# Patient Record
Sex: FEMALE | Race: BLACK OR AFRICAN AMERICAN | NOT HISPANIC OR LATINO
[De-identification: names, ages, dates, MRNs, and addresses within clinical notes are randomized per-mention and may not be internally consistent; named-entity substitution may affect disease eponyms.]

---

## 2019-03-19 PROBLEM — Z00.00 ENCOUNTER FOR PREVENTIVE HEALTH EXAMINATION: Status: ACTIVE | Noted: 2019-03-19

## 2019-03-20 ENCOUNTER — RECORD ABSTRACTING (OUTPATIENT)
Age: 84
End: 2019-03-20

## 2019-03-20 ENCOUNTER — APPOINTMENT (OUTPATIENT)
Dept: ORTHOPEDIC SURGERY | Facility: CLINIC | Age: 84
End: 2019-03-20
Payer: MEDICARE

## 2019-03-20 VITALS — BODY MASS INDEX: 32.02 KG/M2 | HEIGHT: 62 IN | WEIGHT: 174 LBS

## 2019-03-20 DIAGNOSIS — Z86.79 PERSONAL HISTORY OF OTHER DISEASES OF THE CIRCULATORY SYSTEM: ICD-10-CM

## 2019-03-20 DIAGNOSIS — M22.40 CHONDROMALACIA PATELLAE, UNSPECIFIED KNEE: ICD-10-CM

## 2019-03-20 DIAGNOSIS — M47.817 SPONDYLOSIS W/OUT MYELOPATHY OR RADICULOPATHY, LUMBOSACRAL REGION: ICD-10-CM

## 2019-03-20 DIAGNOSIS — M25.40 EFFUSION, UNSPECIFIED JOINT: ICD-10-CM

## 2019-03-20 DIAGNOSIS — M65.9 SYNOVITIS AND TENOSYNOVITIS, UNSPECIFIED: ICD-10-CM

## 2019-03-20 DIAGNOSIS — S83.281S OTHER TEAR OF LATERAL MENISCUS, CURRENT INJURY, RIGHT KNEE, SEQUELA: ICD-10-CM

## 2019-03-20 DIAGNOSIS — S83.249A OTHER TEAR OF MEDIAL MENISCUS, CURRENT INJURY, UNSPECIFIED KNEE, INITIAL ENCOUNTER: ICD-10-CM

## 2019-03-20 DIAGNOSIS — M47.812 SPONDYLOSIS W/OUT MYELOPATHY OR RADICULOPATHY, CERVICAL REGION: ICD-10-CM

## 2019-03-20 DIAGNOSIS — M65.30 TRIGGER FINGER, UNSPECIFIED FINGER: ICD-10-CM

## 2019-03-20 DIAGNOSIS — I82.409 ACUTE EMBOLISM AND THROMBOSIS OF UNSPECIFIED DEEP VEINS OF UNSPECIFIED LOWER EXTREMITY: ICD-10-CM

## 2019-03-20 DIAGNOSIS — Z78.9 OTHER SPECIFIED HEALTH STATUS: ICD-10-CM

## 2019-03-20 DIAGNOSIS — Z86.39 PERSONAL HISTORY OF OTHER ENDOCRINE, NUTRITIONAL AND METABOLIC DISEASE: ICD-10-CM

## 2019-03-20 DIAGNOSIS — Z82.49 FAMILY HISTORY OF ISCHEMIC HEART DISEASE AND OTHER DISEASES OF THE CIRCULATORY SYSTEM: ICD-10-CM

## 2019-03-20 DIAGNOSIS — M79.7 FIBROMYALGIA: ICD-10-CM

## 2019-03-20 DIAGNOSIS — M17.10 UNILATERAL PRIMARY OSTEOARTHRITIS, UNSPECIFIED KNEE: ICD-10-CM

## 2019-03-20 DIAGNOSIS — S63.509A UNSPECIFIED SPRAIN OF UNSPECIFIED WRIST, INITIAL ENCOUNTER: ICD-10-CM

## 2019-03-20 DIAGNOSIS — M19.91 PRIMARY OSTEOARTHRITIS, UNSPECIFIED SITE: ICD-10-CM

## 2019-03-20 PROCEDURE — 20610 DRAIN/INJ JOINT/BURSA W/O US: CPT | Mod: 50

## 2019-03-20 PROCEDURE — 99213 OFFICE O/P EST LOW 20 MIN: CPT | Mod: 25

## 2019-03-20 RX ORDER — ALENDRONATE SODIUM 70 MG/1
70 TABLET ORAL
Refills: 0 | Status: ACTIVE | COMMUNITY

## 2019-03-20 RX ORDER — BRIMONIDINE TARTRATE, TIMOLOL MALEATE 2; 5 MG/ML; MG/ML
0.2-0.5 SOLUTION/ DROPS OPHTHALMIC
Refills: 0 | Status: ACTIVE | COMMUNITY

## 2019-03-20 RX ORDER — DICLOFENAC SODIUM 20 MG/G
SOLUTION TOPICAL
Refills: 0 | Status: ACTIVE | COMMUNITY

## 2019-03-20 RX ORDER — NAPROXEN 250 MG/1
250 TABLET ORAL
Refills: 0 | Status: ACTIVE | COMMUNITY

## 2019-03-20 RX ORDER — OXYBUTYNIN CHLORIDE 10 MG/1
10 TABLET, EXTENDED RELEASE ORAL
Refills: 0 | Status: ACTIVE | COMMUNITY

## 2019-03-20 RX ORDER — TRIAMCINOLONE ACETONIDE 10 MG/ML
10 INJECTION, SUSPENSION INTRA-ARTICULAR; INTRALESIONAL
Refills: 0 | Status: ACTIVE | COMMUNITY

## 2019-03-20 RX ORDER — LATANOPROST/PF 0.005 %
0.01 DROPS OPHTHALMIC (EYE)
Refills: 0 | Status: ACTIVE | COMMUNITY

## 2019-03-20 RX ORDER — DOCUSATE SODIUM 100 MG/1
100 CAPSULE ORAL TWICE DAILY
Refills: 0 | Status: ACTIVE | COMMUNITY

## 2019-03-20 RX ORDER — SIMVASTATIN 40 MG/1
40 TABLET, FILM COATED ORAL
Refills: 0 | Status: ACTIVE | COMMUNITY

## 2019-03-20 RX ORDER — AMMONIUM LACTATE 12 %
12 CREAM (GRAM) TOPICAL
Refills: 0 | Status: ACTIVE | COMMUNITY

## 2019-03-20 RX ORDER — TIMOLOL MALEATE 2.5 MG/ML
0.25 SOLUTION OPHTHALMIC
Refills: 0 | Status: ACTIVE | COMMUNITY

## 2019-03-20 RX ORDER — INSULIN GLARGINE 100 [IU]/ML
100 INJECTION, SOLUTION SUBCUTANEOUS
Refills: 0 | Status: ACTIVE | COMMUNITY

## 2019-03-20 RX ORDER — SENNOSIDES 8.6 MG/1
8.6 TABLET ORAL
Refills: 0 | Status: ACTIVE | COMMUNITY

## 2019-03-20 RX ORDER — ATENOLOL AND CHLORTHALIDONE 100; 25 MG/1; MG/1
100-25 TABLET ORAL
Refills: 0 | Status: ACTIVE | COMMUNITY

## 2019-03-20 RX ORDER — TOBRAMYCIN AND DEXAMETHASONE 1; 3 MG/ML; MG/ML
0.3-0.1 SUSPENSION/ DROPS OPHTHALMIC
Refills: 0 | Status: ACTIVE | COMMUNITY

## 2019-03-20 RX ORDER — ERGOCALCIFEROL 1.25 MG/1
1.25 MG CAPSULE ORAL
Refills: 0 | Status: ACTIVE | COMMUNITY

## 2019-03-20 RX ORDER — CALCIUM CARBONATE/VITAMIN D3 500MG-5MCG
500-5 TABLET ORAL
Refills: 0 | Status: ACTIVE | COMMUNITY

## 2019-03-20 RX ORDER — NAPROXEN SODIUM 220 MG
220 TABLET ORAL
Refills: 0 | Status: ACTIVE | COMMUNITY

## 2019-03-20 RX ORDER — ASPIRIN ENTERIC COATED TABLETS 81 MG 81 MG/1
81 TABLET, DELAYED RELEASE ORAL
Refills: 0 | Status: ACTIVE | COMMUNITY

## 2019-03-20 RX ORDER — TRAVOPROST 0.04 MG/ML
0 SOLUTION/ DROPS OPHTHALMIC
Refills: 0 | Status: ACTIVE | COMMUNITY

## 2019-03-20 RX ORDER — ECONAZOLE NITRATE 10 MG/G
1 CREAM TOPICAL
Refills: 0 | Status: ACTIVE | COMMUNITY

## 2019-03-20 RX ORDER — HYALURONATE SODIUM 30 MG/2 ML
30 SYRINGE (ML) INTRAARTICULAR
Refills: 0 | Status: ACTIVE | COMMUNITY

## 2019-03-20 RX ORDER — POTASSIUM CHLORIDE 20 MEQ
20 TABLET, EXT RELEASE, PARTICLES/CRYSTALS ORAL
Refills: 0 | Status: ACTIVE | COMMUNITY

## 2019-03-20 NOTE — HISTORY OF PRESENT ILLNESS
[Worsening] : worsening [___ mths] : [unfilled] month(s) ago [5] : a current pain level of 5/10 [8] : a maximum pain level of 8/10 [Walking] : walking [Intermit.] : ~He/She~ states the symptoms seem to be intermittent [Knee Flexion] : worsened with knee flexion [NSAIDs] : relieved by nonsteroidal anti-inflammatory drugs

## 2019-03-20 NOTE — ASSESSMENT
[FreeTextEntry1] : COLD PACKS OR HEAT AS NEEDED\par \par ALEVE AS NEEDED\par \par KNEE SLEEVE AS PREFERRED

## 2019-03-20 NOTE — REASON FOR VISIT
[Follow-Up Visit] : a follow-up visit for [Knee Osteoarthritis] : knee osteoarthritis [FreeTextEntry2] : BILATERAL - CHRONIC   - ARTHROSCOPY FOR PSEUDOSEPISI AFTER SYNVISC

## 2019-03-20 NOTE — PROCEDURE
[Injection] : Injection [Bilateral] : of bilateral [Knee Joint] : knee joint [Osteoarthritis] : Osteoarthritis [Patient] : patient [Alcohol] : Alcohol [Ethyl Chloride Spray] : ethyl chloride spray was used as a topical anesthetic [Anterior] : anterior [22] : a 22-gauge [1% Lidocaine___(mL)] : [unfilled] mL of 1% Lidocaine [Triamcinolone 10mg/mL___(mL)] : [unfilled] ~UmL of 10mg/mL triamcinolone [Bandage Applied] : a bandage [PRN] : as needed

## 2019-03-20 NOTE — PHYSICAL EXAM
[] : Motor: [NL] : Motor strength of the left lower extremity was normal [LE] : Sensory: Intact in bilateral lower extremities [Knee Swelling Right] : swelling [Knee Tenderness On Palpation Right] : tenderness [Knee Motion Right] : limited ROM [Knee Motion Right Crepitus] : crepitus with ROM [Knee Swelling Left] : swelling [Knee Tenderness On Palpation Left] : tenderness [Knee Motion Left] : limited ROM [Knee Motion Left Crepitus] : crepitus with ROM

## 2019-09-16 ENCOUNTER — APPOINTMENT (OUTPATIENT)
Dept: ORTHOPEDIC SURGERY | Facility: CLINIC | Age: 84
End: 2019-09-16
Payer: MEDICARE

## 2019-09-16 VITALS — BODY MASS INDEX: 32.02 KG/M2 | WEIGHT: 174 LBS | HEIGHT: 62 IN

## 2019-09-16 DIAGNOSIS — M17.0 BILATERAL PRIMARY OSTEOARTHRITIS OF KNEE: ICD-10-CM

## 2019-09-16 PROCEDURE — 20611 DRAIN/INJ JOINT/BURSA W/US: CPT | Mod: 50

## 2019-09-16 PROCEDURE — 99213 OFFICE O/P EST LOW 20 MIN: CPT | Mod: 25

## 2019-09-16 NOTE — HISTORY OF PRESENT ILLNESS
[Worsening] : worsening [___ mths] : [unfilled] month(s) ago [5] : a current pain level of 5/10 [8] : a maximum pain level of 8/10 [Walking] : walking [Intermit.] : ~He/She~ states the symptoms seem to be intermittent [Knee Flexion] : worsened with knee flexion [NSAIDs] : relieved by nonsteroidal anti-inflammatory drugs [de-identified] : BI LATERAL KNEE PAIN\par FOLLOW UP\par FLARE UP 3 WEEKS AGO\par PAIN LEVEL 7-8/10\par STIFFNESS

## 2019-09-16 NOTE — PROCEDURE
[de-identified] : Patient has demonstrated limited relief from NSAIDS, rest, exercises / PT , and after discussion of the risks and benefits, the patient has elected to proceed with a\par n ULTRASOUND GUIDED corticosteroid injection into the BILATERAL SupeLat PF Knee\par  \par Confirmed that the patient does not have history of prior adverse reactions, active, infections, or relevant allergies. There was no effusion, erythema, or warmth, and the skin was clear\par \par The skin was sterilized with alcohol. Ethyl Chloride was used as a topical anesthetic. Routine sterile technique. \par  \par The KNEE JOINT  was injected utilizing ULTRASOUND GUIDANCEwith KENALOG + LIDOCAINE. The injection was completed without complication and a bandage was applied.\par \par The patient tolerated the procedure well and was given post-injection instructions.Rec: Cold therapy, analgesics, avoid heavy activity.\par \par MEDICATION: Lidocaine 1% 4cc + 10mg of Kenalog\par

## 2019-09-16 NOTE — DISCUSSION/SUMMARY
[de-identified] : POST INJECTION INSTRUCTIONS\par \par COLD THERAPY , ANALGESICS PRN\par \par HOME STRETCHING AND EXERCISES QD\par \par

## 2019-09-16 NOTE — PHYSICAL EXAM
[] : Motor: [NL] : Motor strength of the left lower extremity was normal [Knee Swelling Right] : swelling [LE] : Sensory: Intact in bilateral lower extremities [Knee Tenderness On Palpation Right] : tenderness [Knee Motion Right Crepitus] : crepitus with ROM [Knee Motion Right] : limited ROM [Knee Swelling Left] : swelling [Knee Motion Left Crepitus] : crepitus with ROM [Knee Motion Left] : limited ROM [Knee Tenderness On Palpation Left] : tenderness [de-identified] : PHYSICAL EXAM BILATERAL KNEES\par \par AROM\par RIGHT  0- 110\par LEFT    0-110\par \par SPECIAL TESTS\par \par PATELLAR GRIND = GRIND \par DRAWER  = NEG\par LACHMAN = NEG\par MACMURRAY = NEG \par \par MOTOR = GROSSLY INTACT\par SENSORY = GROSSLY INTACT \par \par \par